# Patient Record
(demographics unavailable — no encounter records)

---

## 2025-03-21 NOTE — HISTORY OF PRESENT ILLNESS
[FreeTextEntry1] : Ms. ARENAS is a 72 year old female referred by Dr. Wong who presents for evaluation of rib fractures s/p fall with resulting hemothorax hemoptysis.   Her past medical history includes HTN, chronic DVT (2016 on Xarelto), hypothyroid, hyperlipidemia, atrial tachycardia. She had initially presented to [facility] on 2/14/25 after sustaining a fall. She had significant right thoracic rib pain and imaging noted 4 rib fractures on the right.   02/20/25: CTA of the chest from [facility]  Mild dependent, partially loculated RIGHT pleural effusion/ hemothorax, minimally increased w/ partial RLL atelectasis.  Gastric band and distended distal esophagus, unchanged RIGHT lateral 7th, 8, 9th (displaced) 10th rib fractures, unchanged. LEFT glenohumeral joint bursitis or effusion

## 2025-03-21 NOTE — ASSESSMENT
[FreeTextEntry1] : Ms. ARENAS is a 72 year old female referred by Dr. Wong who presents for evaluation of rib fractures s/p fall with resulting hemothorax hemoptysis.   Her past medical history includes HTN, chronic DVT (2016 on Xarelto), hypothyroid, hyperlipidemia, atrial tachycardia. She had initially presented to [facility] on 2/14/25 after sustaining a fall. She had significant right thoracic rib pain and imaging noted 4 rib fractures on the right.   02/20/25: CTA of the chest from [facility]  Mild dependent, partially loculated RIGHT pleural effusion/ hemothorax, minimally increased w/ partial RLL atelectasis.  Gastric band and distended distal esophagus, unchanged RIGHT lateral 7th, 8, 9th (displaced) 10th rib fractures, unchanged. LEFT glenohumeral joint bursitis or effusion   I have reviewed the patient's medical records and diagnostic images at time of this office consultation and have made the following recommendation: 1.Pt has improved symptomatically.  No complaints of rib pain and shortness of breath has improved 2. Will return in 2 weeks with cat scan chest   All questions answered. Patient verbalized understanding and will follow up accordingly.

## 2025-03-21 NOTE — HISTORY OF PRESENT ILLNESS
[FreeTextEntry1] : Ms. ARENAS is a 72 year old female referred by Dr. Wong who presents for evaluation of rib fractures s/p fall with resulting hemothorax hemoptysis.   Her past medical history includes HTN, chronic DVT (2016 on Xarelto), hypothyroid, hyperlipidemia, atrial tachycardia. She had initially presented to [facility] on 2/14/25 after sustaining a fall. She had significant right thoracic rib pain and imaging noted 4 rib fractures on the right.   02/20/25: CTA of the chest from [facility]  Mild dependent, partially loculated RIGHT pleural effusion/ hemothorax, minimally increased w/ partial RLL atelectasis.  Gastric band and distended distal esophagus, unchanged RIGHT lateral 7th, 8, 9th (displaced) 10th rib fractures, unchanged. LEFT glenohumeral joint bursitis or effusion

## 2025-03-21 NOTE — DATA REVIEWED
[FreeTextEntry1] : Independent review of imaging and independent interpretation was performed at today's visit. - CTA of the chest from 02/20/25 at [facility]

## 2025-03-21 NOTE — ASSESSMENT
[FreeTextEntry1] : Ms. ARENAS is a 72 year old female referred by Dr. Wong who presents for evaluation of rib fractures s/p fall with resulting hemothorax hemoptysis.   Her past medical history includes HTN, chronic DVT (2016 on Xarelto), hypothyroid, hyperlipidemia, atrial tachycardia. She had initially presented to [facility] on 2/14/25 after sustaining a fall. She had significant right thoracic rib pain and imaging noted 4 rib fractures on the right.   02/20/25: CTA of the chest from [facility]  Mild dependent, partially loculated RIGHT pleural effusion/ hemothorax, minimally increased w/ partial RLL atelectasis.  Gastric band and distended distal esophagus, unchanged RIGHT lateral 7th, 8, 9th (displaced) 10th rib fractures, unchanged. LEFT glenohumeral joint bursitis or effusion   I have reviewed the patient's medical records and diagnostic images at time of this office consultation and have made the following recommendation: 1.Pt has improved symptomatically.  No complaints of rib pain and shortness of breath has improved 2. Will return in 2 weeks with cat scan chest   All questions answered. Patient verbalized understanding and will follow up accordingly.

## 2025-03-21 NOTE — DATA REVIEWED
[FreeTextEntry1] : Independent review of imaging and independent interpretation was performed at today's visit. - CTA of the chest from 02/20/25 at [facility]

## 2025-03-21 NOTE — PHYSICAL EXAM
[General Appearance - Alert] : alert [General Appearance - In No Acute Distress] : in no acute distress [Neck Appearance] : the appearance of the neck was normal [] : no respiratory distress [Respiration, Rhythm And Depth] : normal respiratory rhythm and effort [Examination Of The Chest] : the chest was normal in appearance [Chest Visual Inspection Thoracic Asymmetry] : no chest asymmetry [Bowel Sounds] : normal bowel sounds [Abdomen Soft] : soft [Oriented To Time, Place, And Person] : oriented to person, place, and time

## 2025-03-26 NOTE — ASSESSMENT
[FreeTextEntry1] : Ms. ARENAS is a 72 year old female referred by Dr. Wong who presents for evaluation of rib fractures s/p fall with resulting hemothorax hemoptysis. Her past medical history includes HTN, chronic DVT (2016 on Xarelto), hypothyroid, hyperlipidemia, atrial tachycardia. She had initially presented to [facility] on 2/14/25 after sustaining a fall. She had significant right thoracic rib pain and imaging noted 4 rib fractures on the right.  Independent review of imaging and independent interpretation was performed at today's visit.  CT of the chest from [facility] on 04/03/25  I have reviewed the patient's medical records and diagnostic images at time of this office consultation and have made the following recommendation: 1.

## 2025-03-26 NOTE — HISTORY OF PRESENT ILLNESS
[FreeTextEntry1] : Ms. ARENAS is a 72 year old female referred by Dr. Wong who presents for evaluation of rib fractures s/p fall with resulting hemothorax hemoptysis.  Her past medical history includes HTN, chronic DVT (2016 on Xarelto), hypothyroid, hyperlipidemia, atrial tachycardia. She had initially presented to [facility] on 2/14/25 after sustaining a fall. She had significant right thoracic rib pain and imaging noted 4 rib fractures on the right.  02/20/25: CTA of the chest from [facility] Mild dependent, partially loculated RIGHT pleural effusion/ hemothorax, minimally increased w/ partial RLL atelectasis. Gastric band and distended distal esophagus, unchanged RIGHT lateral 7th, 8, 9th (displaced) 10th rib fractures, unchanged. LEFT glenohumeral joint bursitis or effusion  04/03/25: CT of the chest from [facility]   At our last visit it was recommended that she obtain CT Of the chest and return to care to discuss results.

## 2025-03-26 NOTE — DATA REVIEWED
[FreeTextEntry1] : Independent review of imaging and independent interpretation was performed at today's visit. CT of the chest from [facility] on 04/03/25

## 2025-03-28 NOTE — HISTORY OF PRESENT ILLNESS
[FreeTextEntry1] : Ms. ARENAS is a 72 year old female referred by Dr. Wong who presents for evaluation of rib fractures s/p fall with resulting hemothorax hemoptysis.  Her past medical history includes HTN, chronic DVT (2016 on Xarelto), hypothyroid, hyperlipidemia, atrial tachycardia. She had initially presented to [facility] on 2/14/25 after sustaining a fall. She had significant right thoracic rib pain and imaging noted 4 rib fractures on the right.  02/20/25: CTA of the chest from [facility] Mild dependent, partially loculated RIGHT pleural effusion/ hemothorax, minimally increased w/ partial RLL atelectasis. Gastric band and distended distal esophagus, unchanged RIGHT lateral 7th, 8, 9th (displaced) 10th rib fractures, unchanged. LEFT glenohumeral joint bursitis or effusion  04/03/25: CT of the chest from [facility]   At our last visit it was recommended that she obtain CT Of the chest and return to care to discuss results.

## 2025-03-28 NOTE — DATA REVIEWED
[FreeTextEntry1] : Independent review of imaging and independent interpretation was performed at today's visit. CT of the chest from [facility] on 04/03/25

## 2025-03-28 NOTE — ASSESSMENT
[FreeTextEntry1] : Ms. ARENAS is a 72 year old female referred by Dr. Wong who presents for evaluation of rib fractures s/p fall with resulting hemothorax hemoptysis. Her past medical history includes HTN, chronic DVT (2016 on Xarelto), hypothyroid, hyperlipidemia, atrial tachycardia. She had initially presented to [facility] on 2/14/25 after sustaining a fall. She had significant right thoracic rib pain and imaging noted 4 rib fractures on the right.  Independent review of imaging and independent interpretation was performed at today's visit.  CT of the chest from [facility] on 04/03/25  I have reviewed the patient's medical records and diagnostic images at time of this office consultation and have made the following recommendation: 1.